# Patient Record
Sex: FEMALE | URBAN - METROPOLITAN AREA
[De-identification: names, ages, dates, MRNs, and addresses within clinical notes are randomized per-mention and may not be internally consistent; named-entity substitution may affect disease eponyms.]

---

## 2023-02-24 ENCOUNTER — APPOINTMENT (RX ONLY)
Dept: URBAN - METROPOLITAN AREA CLINIC 55 | Facility: CLINIC | Age: 28
Setting detail: DERMATOLOGY
End: 2023-02-24

## 2023-02-24 DIAGNOSIS — Z41.9 ENCOUNTER FOR PROCEDURE FOR PURPOSES OTHER THAN REMEDYING HEALTH STATE, UNSPECIFIED: ICD-10-CM

## 2023-02-24 PROCEDURE — ? COSMETIC CONSULTATION: GENERAL

## 2023-02-24 PROCEDURE — ? INVENTORY

## 2023-03-22 ENCOUNTER — APPOINTMENT (RX ONLY)
Dept: URBAN - METROPOLITAN AREA CLINIC 55 | Facility: CLINIC | Age: 28
Setting detail: DERMATOLOGY
End: 2023-03-22

## 2023-03-22 DIAGNOSIS — Z41.9 ENCOUNTER FOR PROCEDURE FOR PURPOSES OTHER THAN REMEDYING HEALTH STATE, UNSPECIFIED: ICD-10-CM

## 2023-03-22 PROCEDURE — ? INVENTORY

## 2023-03-22 PROCEDURE — ? BOTOX

## 2023-03-22 NOTE — PROCEDURE: BOTOX
Additional Area 2 Units: 0
Additional Area 1 Location: Lip
Incrementing Botox Units: By 0.5 Units
Show Additional Area 5: Yes
Post-Care Instructions: Patient instructed to not lie down for 4 hours and limit physical activity for 24 hours.
Show Ucl Units: No
Consent obtained. Risks include but not limited to lid/brow ptosis, bruising, swelling, diplopia, temporary effect, incomplete chemical denervation.
Show Inventory Tab: Hide
Glabellar Complex Units: 16
Forehead Units: 9
Additional Area 3 Location: gummy smile
Additional Area 2 Location: Nasalis
Dilution (U/0.1 Cc): 4
Detail Level: Detailed